# Patient Record
Sex: FEMALE | Race: BLACK OR AFRICAN AMERICAN | Employment: STUDENT | URBAN - METROPOLITAN AREA
[De-identification: names, ages, dates, MRNs, and addresses within clinical notes are randomized per-mention and may not be internally consistent; named-entity substitution may affect disease eponyms.]

---

## 2021-04-24 ENCOUNTER — AMBULATORY - HEALTHEAST (OUTPATIENT)
Dept: CARE COORDINATION | Facility: HOSPITAL | Age: 21
End: 2021-04-24

## 2021-04-24 LAB — INR (EXTERNAL): 1.1 (ref 0.9–1.1)

## 2021-06-16 PROBLEM — F10.20 SEVERE ALCOHOL USE DISORDER (H): Chronic | Status: ACTIVE | Noted: 2021-04-25

## 2021-06-16 PROBLEM — F33.1 MAJOR DEPRESSIVE DISORDER, RECURRENT EPISODE, MODERATE (H): Status: ACTIVE | Noted: 2021-04-26

## 2021-06-16 PROBLEM — F12.20 SEVERE CANNABIS USE DISORDER (H): Chronic | Status: ACTIVE | Noted: 2021-04-25

## 2021-06-16 PROBLEM — F43.23 ADJUSTMENT DISORDER WITH MIXED ANXIETY AND DEPRESSED MOOD: Chronic | Status: ACTIVE | Noted: 2021-04-25
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
## 2021-07-04 LAB
ALT SERPL-CCNC: 13 U/L (ref 7–45)
AST SERPL-CCNC: 19 U/L (ref 8–43)
HEP C HIM: NORMAL
HIV 1&2 EXT: NORMAL

## 2021-07-14 PROBLEM — F33.2 SEVERE RECURRENT MAJOR DEPRESSION WITHOUT PSYCHOTIC FEATURES (H): Chronic | Status: RESOLVED | Noted: 2021-04-25 | Resolved: 2021-04-26

## 2021-07-29 ENCOUNTER — TRANSFERRED RECORDS (OUTPATIENT)
Dept: HEALTH INFORMATION MANAGEMENT | Facility: CLINIC | Age: 21
End: 2021-07-29

## 2021-07-30 ENCOUNTER — TRANSFERRED RECORDS (OUTPATIENT)
Dept: HEALTH INFORMATION MANAGEMENT | Facility: CLINIC | Age: 21
End: 2021-07-30

## 2021-07-30 ENCOUNTER — TELEPHONE (OUTPATIENT)
Dept: BEHAVIORAL HEALTH | Facility: CLINIC | Age: 21
End: 2021-07-30

## 2021-07-30 LAB
CREATININE (EXTERNAL): 0.95 MG/DL (ref 0.59–1.04)
GFR ESTIMATED (EXTERNAL): 87 ML/MIN/BSA
GFR ESTIMATED (IF AFRICAN AMERICAN) (EXTERNAL): >90 ML/MIN/BSA
GLUCOSE (EXTERNAL): 83 MG/DL (ref 70–140)
POTASSIUM (EXTERNAL): 3.9 MMOL/L (ref 3.6–5.2)
TSH SERPL-ACNC: 0.5 MIU/L (ref 0.5–4.3)

## 2021-07-31 ENCOUNTER — HOSPITAL ENCOUNTER (INPATIENT)
Facility: CLINIC | Age: 21
LOS: 6 days | Discharge: HOME | DRG: 885 | End: 2021-08-06
Attending: PSYCHIATRY & NEUROLOGY | Admitting: PSYCHIATRY & NEUROLOGY
Payer: COMMERCIAL

## 2021-07-31 DIAGNOSIS — G47.9 SLEEP DIFFICULTIES: Primary | ICD-10-CM

## 2021-07-31 DIAGNOSIS — F33.2 MDD (MAJOR DEPRESSIVE DISORDER), RECURRENT SEVERE, WITHOUT PSYCHOSIS (H): ICD-10-CM

## 2021-07-31 DIAGNOSIS — F41.9 ANXIETY: ICD-10-CM

## 2021-07-31 DIAGNOSIS — F43.23 ADJUSTMENT DISORDER WITH MIXED ANXIETY AND DEPRESSED MOOD: ICD-10-CM

## 2021-07-31 DIAGNOSIS — F10.20 SEVERE ALCOHOL USE DISORDER (H): ICD-10-CM

## 2021-07-31 DIAGNOSIS — R52 PAIN: ICD-10-CM

## 2021-07-31 PROBLEM — F39 MOOD DISORDER (H): Status: ACTIVE | Noted: 2021-07-31

## 2021-07-31 PROCEDURE — 124N000001 HC R&B MH

## 2021-07-31 PROCEDURE — 128N000001 HC R&B CD/MH ADULT

## 2021-07-31 RX ORDER — ACETAMINOPHEN 325 MG/1
650 TABLET ORAL EVERY 4 HOURS PRN
Status: DISCONTINUED | OUTPATIENT
Start: 2021-07-31 | End: 2021-08-06 | Stop reason: HOSPADM

## 2021-07-31 RX ORDER — OLANZAPINE 10 MG/2ML
10 INJECTION, POWDER, FOR SOLUTION INTRAMUSCULAR 3 TIMES DAILY PRN
Status: DISCONTINUED | OUTPATIENT
Start: 2021-07-31 | End: 2021-08-06 | Stop reason: HOSPADM

## 2021-07-31 RX ORDER — HYDROXYZINE HYDROCHLORIDE 25 MG/1
25 TABLET, FILM COATED ORAL EVERY 4 HOURS PRN
Status: DISCONTINUED | OUTPATIENT
Start: 2021-07-31 | End: 2021-08-06

## 2021-07-31 RX ORDER — TRAZODONE HYDROCHLORIDE 50 MG/1
50 TABLET, FILM COATED ORAL
Status: DISCONTINUED | OUTPATIENT
Start: 2021-07-31 | End: 2021-08-06 | Stop reason: HOSPADM

## 2021-07-31 RX ORDER — AMOXICILLIN 250 MG
1 CAPSULE ORAL 2 TIMES DAILY PRN
Status: DISCONTINUED | OUTPATIENT
Start: 2021-07-31 | End: 2021-08-06 | Stop reason: HOSPADM

## 2021-07-31 RX ORDER — OLANZAPINE 10 MG/1
10 TABLET ORAL 3 TIMES DAILY PRN
Status: DISCONTINUED | OUTPATIENT
Start: 2021-07-31 | End: 2021-08-06 | Stop reason: HOSPADM

## 2021-07-31 RX ORDER — MAGNESIUM HYDROXIDE/ALUMINUM HYDROXICE/SIMETHICONE 120; 1200; 1200 MG/30ML; MG/30ML; MG/30ML
30 SUSPENSION ORAL EVERY 4 HOURS PRN
Status: DISCONTINUED | OUTPATIENT
Start: 2021-07-31 | End: 2021-08-06 | Stop reason: HOSPADM

## 2021-07-31 ASSESSMENT — ACTIVITIES OF DAILY LIVING (ADL)
DIFFICULTY_EATING/SWALLOWING: NO
DRESSING/BATHING_DIFFICULTY: NO
DRESS: INDEPENDENT
LAUNDRY: UNABLE TO COMPLETE
DIFFICULTY_COMMUNICATING: NO
ADL_ASSESSMENT: WDL
TOILETING_ISSUES: NO
CONCENTRATING,_REMEMBERING_OR_MAKING_DECISIONS_DIFFICULTY: NO
HEARING_DIFFICULTY_OR_DEAF: NO
PATIENT_/_FAMILY_COMMUNICATION_STYLE: SPOKEN LANGUAGE (ENGLISH OR BILINGUAL)
FALL_HISTORY_WITHIN_LAST_SIX_MONTHS: NO
WALKING_OR_CLIMBING_STAIRS_DIFFICULTY: NO
WEAR_GLASSES_OR_BLIND: YES
ORAL_HYGIENE: INDEPENDENT
HYGIENE/GROOMING: INDEPENDENT
DOING_ERRANDS_INDEPENDENTLY_DIFFICULTY: NO

## 2021-07-31 ASSESSMENT — MIFFLIN-ST. JEOR: SCORE: 1884.92

## 2021-07-31 NOTE — TELEPHONE ENCOUNTER
"S: [facility] ED called to place a 19 y/o female for inpatient mental health treatment.    B: Pt is a 19 y/o female with hx of MDD, Adjustment disorder with mixed disturbance of emotion and conduct, morbid obesity, and diabetes mellitus type 2 who presented to the [place] ED via police due to SI. Pt reported that she \"didn't really want to talk about what happened\" and declined to talk about suicidal ideation. The pt reports that in the past 3 months she has had \"a lot of stress\" and her symptoms of depression have increased. Pt's mother, Benjamín, provided collateral information and reported concerns of the pt making suicidal remarks and last night stating \"I want to kill myself\" while trying to overdose on medication and grabbing a large knife. Mother reported that the pt has been withholding information from her and having concerning behaviors. Mother reported that the pt was supposed to start a new job on Wednesday, but didn't go to it because she was sleeping. She stated that the pt scare the other children in the home with the events that occurred last night and she does not follow up with outpatient services after being in the hospital. Mother reports that she would like to see th pt get help and follow through with services after leaving the hospital. Pt reports hx of 2 previous psychiatric hospitalizations. Pt declined to discuss SI with either the ED MD or the mental health  but pt's mother reports that pt has gotten pills and a knife with intent to use them to end her life. Mother stated that last night the pt grabbed a bunch of medication to overdose on and went to the kitchen and grabbed a large knife and threatened to cut herself. Pt's mother stated that she had a struggle with the pt while the pt was trying to get these items, and when the police arrived the pt was still holding the knife making statements about ending her life. Pt did not endorse any HI. Pt endorses alcohol use \"from " "time to time\". Mother reports pt is using alcohol about once a week. When pt presented to the ED on 7/30/21 she had a WINNIE of 0.199. Pt reports hx of trying other drugs such as marijuana, acid, cocaine, and mushrooms. Utox is negative. She is negative for COVID-19. Medically cleared.    A: Voluntary.    R: Dr. Del Cid paged at to review for placement on . [facility]. Dr. Del Cid approved admission at 2203. Unit notified at 2225. ED notified at 2229.  Patient cleared and ready for behavioral bed placement: Yes    "

## 2021-07-31 NOTE — PROGRESS NOTES
Pt arrives to unit at 14:10, assessment started. Pt came from [place] [facility]. Pt says she has been there since Thursday. This is her third mental health hospitalization.

## 2021-08-01 PROCEDURE — 250N000013 HC RX MED GY IP 250 OP 250 PS 637: Performed by: PSYCHIATRY & NEUROLOGY

## 2021-08-01 PROCEDURE — 99223 1ST HOSP IP/OBS HIGH 75: CPT | Performed by: PSYCHIATRY & NEUROLOGY

## 2021-08-01 PROCEDURE — 128N000001 HC R&B CD/MH ADULT

## 2021-08-01 PROCEDURE — 124N000001 HC R&B MH

## 2021-08-01 RX ADMIN — TRAZODONE HYDROCHLORIDE 50 MG: 50 TABLET ORAL at 20:13

## 2021-08-01 ASSESSMENT — ACTIVITIES OF DAILY LIVING (ADL)
DRESS: INDEPENDENT
ORAL_HYGIENE: INDEPENDENT
ORAL_HYGIENE: INDEPENDENT
LAUNDRY: WITH SUPERVISION
HYGIENE/GROOMING: INDEPENDENT
HYGIENE/GROOMING: SHOWER;INDEPENDENT
DRESS: INDEPENDENT
LAUNDRY: WITH SUPERVISION

## 2021-08-01 NOTE — PHARMACY-ADMISSION MEDICATION HISTORY
"Pharmacy Note - Admission Medication History    Pertinent Provider Information: Patient reports she is not taking any medications, prescribed or OTC. She states she was \"supposed\" to be on lexapro but stopped taking it because \"I felt like I didn't need it.\"   ______________________________________________________________________    Prior To Admission (PTA) med list completed and updated in EMR.       No outpatient medications have been marked as taking for the 7/31/21 encounter (Hospital Encounter).       Information source(s): Patient, Clinic records, Hospital records and [facility]  Method of interview communication: phone    The information provided in this note is only as accurate as the sources available at the time of the update(s).    Thank you for the opportunity to participate in the care of this patient.    Maritza Coleman RPH  8/1/2021 4:03 PM    "

## 2021-08-01 NOTE — PLAN OF CARE
Problem: Sleep Disturbance  Goal: Adequate Sleep/Rest  Outcome: Improving     As of 0515, has slept > 6 hrs. Safety checks completed every 15 minutes. Suicide precautions continued. Pt has been sleeping uneventfully.
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
## 2021-08-01 NOTE — PROGRESS NOTES
"Staff reported to writer pt looked at her lunch tray and put it back and told staff she was not going to eat lunch. Staff reported to writer that pt then proceeded to her room and slammed her door shut. Writer spoke with pt and she refuses to meet with pharmacist this shift. Pt declines to talk to writer. She is dismissive. Pt states, \"I'm going back to bed.\" Will continue to monitor and assist as needed.   "

## 2021-08-01 NOTE — PROGRESS NOTES
08/01/21 1424   Engagement   Intervention Group   Topic Detail Activity for spontaneous recall, abstract thinking, categorizing, word-finding, concentration, and healthy leisure engagement   Attendance Did not attend  (sleeping on approach)

## 2021-08-01 NOTE — PLAN OF CARE
Problem: Behavioral Health Plan of Care  Goal: Adheres to Safety Considerations for Self and Others  Outcome: Improving     Problem: Suicidal Behavior  Goal: Suicidal Behavior is Absent or Managed  Outcome: Improving  Flowsheets (Taken 8/1/2021 1340)  Mutually Determined Action Steps (Suicidal Behavior Absent/Managed): verbalizes safety check rationale     Pt dismissive during assessment questions. Denies SI. Contracts for safety. LAI pt's anxiety and depression level as pt refuses to report. No hallucinations observed by writer - pt refused to answer. Pt denies pain. Pt irritable and dismissive throughout the day. Pt attended breakfast and went back to her room immediately after stating she wanted to sleep. Pt came back out for lunch but did not eat - see writer's previous note. Writer spoke to 5500 pharmacist regarding pt's refusal to meet with her this shift, and pharmacist is going to re-attempt around dinner time. Pt is withdrawn. She is observed sleeping majority of the day. She is isolative to her room. Pt is quiet and makes minimal eye contact. HR 50 this morning - pt refused recheck. She is on suicide precautions. Will continue to monitor and assist as needed.

## 2021-08-01 NOTE — PLAN OF CARE
"  Problem: Behavioral Health Plan of Care  Goal: Plan of Care Review  Outcome: Improving     Problem: Suicidal Behavior  Goal: Suicidal Behavior is Absent or Managed  Outcome: Improving      Problem: Behavioral Health Plan of Care  Goal: Plan of Care Review  Outcome: Improving     Problem: Suicidal Behavior  Goal: Suicidal Behavior is Absent or Managed  Outcome: Improving     Patient is a new admit from [facility]. Admitted at about 1410. Per report, pt presented to the ED via police due to SI but did not want to talk about what happened. Per report, pt stated that in the last 3 months, she has had a lot of stressors which have increased her depression. Patient's mother reported that patient have been making suicidal remarks and last night, patient tried to overdose on Zoloft, grabbed a knife and attempted to cut herself before patient's mom intervened. Patient's mom further reported that patient has been withholding information from her and having concerning behaviors. Pt's mom reports that she would like to see pt get help and follow through with services after leaving the hospital. Patient reported hx of 2 previous psychiatric hospitalization. On assessment, pt is alert and oriented x 4. Affect is flat and blunted. Mood observed as depressed. Patient is calm, withdrawn and isolative to self. Minimal speech, but clear and polite. Evasive eye contact. Minimally cooperative with admission assessment. Patient stated, \"I dont want to discuss about what happened.\" Patient did not interact with peers. Did not attend group. Guarded and dismissive. Intermittently present in the lounge and watched movie. Denied pain, anxiety, depression, SI/HI. Mood is labile. Thought content is coherent. Denied hallucinations/delusions. Contracted for safety. Had shower and reported feeling better.                           "

## 2021-08-02 PROCEDURE — 124N000001 HC R&B MH

## 2021-08-02 PROCEDURE — 90853 GROUP PSYCHOTHERAPY: CPT

## 2021-08-02 PROCEDURE — 250N000013 HC RX MED GY IP 250 OP 250 PS 637: Performed by: PSYCHIATRY & NEUROLOGY

## 2021-08-02 PROCEDURE — 128N000001 HC R&B CD/MH ADULT

## 2021-08-02 PROCEDURE — 99233 SBSQ HOSP IP/OBS HIGH 50: CPT | Performed by: PSYCHIATRY & NEUROLOGY

## 2021-08-02 RX ADMIN — SERTRALINE HYDROCHLORIDE 50 MG: 50 TABLET ORAL at 08:27

## 2021-08-02 RX ADMIN — ACETAMINOPHEN 650 MG: 325 TABLET ORAL at 08:27

## 2021-08-02 RX ADMIN — TRAZODONE HYDROCHLORIDE 50 MG: 50 TABLET ORAL at 20:30

## 2021-08-02 ASSESSMENT — ACTIVITIES OF DAILY LIVING (ADL)
DRESS: SCRUBS (BEHAVIORAL HEALTH)
ORAL_HYGIENE: INDEPENDENT
ORAL_HYGIENE: INDEPENDENT
DRESS: INDEPENDENT
HYGIENE/GROOMING: INDEPENDENT
HYGIENE/GROOMING: INDEPENDENT
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
## 2021-08-02 NOTE — PLAN OF CARE
Occupational Therapy   AIDET      Patient was introduced to the role of occupational therapy and oriented to the process of occupational therapy services on the unit, including function of groups. Patient was given the Occupational Therapy Questionnaire to complete. Patient in bed on approach. Pt minimally responsive to therapist. Questionnaire left on night stand. OT will follow up with assessment and address any questions, needs, or concerns.    Therapist: Natalie Peterson, OTR  8/2/2021

## 2021-08-02 NOTE — PROVIDER NOTIFICATION
08/02/21 1200   Engagement   Intervention Group   Topic Detail DBT Process Group   Attendance Attended   Patient Response Demonstrated understanding of materials provided   Concentrated on Task duration of group   Cognition Goal-directed   Mood/Affect Congruent   Social/Behavioral Cooperative   Thought Content [place]     GROUP LENGTH (MINS):   45   RELEVANT PRIMARY DIAGNOSIS: Patient Active Problem List   Diagnosis     Severe alcohol use disorder (H)     Severe cannabis use disorder (H)     Adjustment disorder with mixed anxiety and depressed mood     Major depressive disorder, recurrent episode, moderate (H)     Mood disorder (H)        TREATMENT MODALITY:      []CBT       [x]DBT       []ACT       []Interpersonal psychotherapy                                 []Psychoeducational therapy       []Skill development       []Other:        ATTENDANCE:        [x]Stayed for entire group     []Arrived late    [] Left early       # OF PATIENTS IN GROUP: 7   BILLABLE:     [x]Yes            []No   Notes:
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
## 2021-08-02 NOTE — PROGRESS NOTES
08/02/21 1515   Engagement   Intervention Group   Topic Detail OT Creative Expressions group-Watercolor pencil cards for creativity, symptom management, healthy distraction, social engagement and leisure exploration   Attendance Attended   Patient Response Demonstrated understanding of materials provided   Concentrated on Task 5 - 10 min   Mood/Affect Pleasant   Goals addressed in session today pt pulled from group by staff, returned at end of group

## 2021-08-02 NOTE — PLAN OF CARE
Problem: Behavioral Health Plan of Care  Goal: Optimized Coping Skills in Response to Life Stressors  Outcome: Improving  Note:  Pleasant when asked assessment questions. Denies anxiety, depression, and all psyche symptoms, contracts for safety. Isolative to room during morning, attends groups after lunch.

## 2021-08-02 NOTE — PLAN OF CARE
Problem: Sleep Disturbance  Goal: Adequate Sleep/Rest  Outcome: Improving     As of 0600, has slept > 6 hrs. Safety checks completed every 15 minutes. Suicide precautions continued. Pt has been sleeping uneventfully.

## 2021-08-02 NOTE — PLAN OF CARE
Problem: Behavioral Health Plan of Care  Goal: Adheres to Safety Considerations for Self and Others  Outcome: Improving     Problem: Suicidal Behavior  Goal: Suicidal Behavior is Absent or Managed  Outcome: Improving     Affect flat and blunted. Irritable at times especially when asked questions. Denies SI. Refuses to rate anxiety & depression. Contracts for safety. . Dismissive with assessment questions. Isolative to room at the beginning of the shift, but came out and stayed in the lounge after supper reading a book. Did not interact with peers. Attended group and community meeting. Had shower and reported feeling better. Appetite is good. Ate 100% of her dinner. Spoke to pharmacist on the phone about her medication. Prn trazodone given per patient request.

## 2021-08-03 PROCEDURE — 99232 SBSQ HOSP IP/OBS MODERATE 35: CPT | Performed by: PSYCHIATRY & NEUROLOGY

## 2021-08-03 PROCEDURE — 99231 SBSQ HOSP IP/OBS SF/LOW 25: CPT | Performed by: PSYCHOLOGIST

## 2021-08-03 PROCEDURE — 250N000013 HC RX MED GY IP 250 OP 250 PS 637: Performed by: PSYCHIATRY & NEUROLOGY

## 2021-08-03 PROCEDURE — 128N000001 HC R&B CD/MH ADULT

## 2021-08-03 PROCEDURE — 124N000001 HC R&B MH

## 2021-08-03 RX ADMIN — TRAZODONE HYDROCHLORIDE 50 MG: 50 TABLET ORAL at 20:54

## 2021-08-03 RX ADMIN — HYDROXYZINE HYDROCHLORIDE 25 MG: 25 TABLET, FILM COATED ORAL at 16:21

## 2021-08-03 RX ADMIN — SERTRALINE HYDROCHLORIDE 50 MG: 50 TABLET ORAL at 08:53

## 2021-08-03 ASSESSMENT — ACTIVITIES OF DAILY LIVING (ADL)
LAUNDRY: WITH SUPERVISION
ORAL_HYGIENE: INDEPENDENT
DRESS: INDEPENDENT
DRESS: INDEPENDENT
HYGIENE/GROOMING: INDEPENDENT
LAUNDRY: WITH SUPERVISION
ORAL_HYGIENE: INDEPENDENT
HYGIENE/GROOMING: INDEPENDENT

## 2021-08-03 ASSESSMENT — MIFFLIN-ST. JEOR: SCORE: 1876.3

## 2021-08-03 NOTE — PLAN OF CARE
Problem: Behavioral Health Plan of Care  Goal: Plan of Care Review  Outcome: No Change  Goal: Patient-Specific Goal (Individualization)  Outcome: No Change  Note:        Problem: Behavioral Health Plan of Care  Goal: Plan of Care Review  Outcome: No Change     Problem: Behavioral Health Plan of Care  Goal: Patient-Specific Goal (Individualization)  Outcome: No Change  Note:
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
## 2021-08-03 NOTE — PLAN OF CARE
Problem: Behavioral Health Plan of Care  Goal: Plan of Care Review  Outcome: Improving    Patient pleasant, visible, and engaged. Denies any anxiety, depression, SI/HI and other psych symptoms. Mood is calm. Contracts for safety. Patient attended meeting/OT groups. Medications and meals cooperative. No suicidal behavior noted.

## 2021-08-03 NOTE — PLAN OF CARE
"   08/03/21 1100   Prior Level of Function   People in home parent(s);sibling(s)  (3 younger siblings and mom)   ADLs   Activity/Exercise/Self-Care Comment Pt endorses sleeping too little but otherwise managing basic ADL's.    Therapy Assessment   Patient Presentation Pt presents as well groomed, reading book next to peers in lounge upon OT approach and agreeable to meet with writer. Declines to fill out OT assessment form provided yesterday but engages in some interview questions with writer. Pt is pleasant and cooperative, smiling at times throughout interview also somewhat labile; abruptly stating multiple times \"are we done?\" while smiling. Pt is mostly superficial/guarded in responses though does report that talking with peers on unit has helped her to become more comfortable talking with staff about needs.    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    Healthy Leisure Activities makeup, swimming    Patient-identified areas of difficulty Memory problems;Lacks support;Motivation/mood;Using drugs or alcohol;Sleeping too much or not enough;Missing work/appointments;Lack of satisfying daily routine   Patient-identified sources of support Safe place to live;Family, friends or caregivers to help when needed;A best frient or significant other;Belief in self and abilities   Patient-identified coping stategies for these concerns Being by myself, breathing, walking away from situation.    Patient-identified stressors or changes in the past year Pt reports that she did not finish senior year of high school due to stressors, however did not elaborate on stressors.    Patient-identified values privacy/boundaries "   Patient's goal for the future To have a satisfying daily routine    Patient's goals to work on with OT Feel better;Learn ways to stay well and avoid coming to the hospital;Share own thoughts and feelings;Develop a satisfying daily routine   Clinical Impression   Patient Personal Strengths independent living skills;interests/hobbies;self-awareness;family/social support;positive attitude   Pt appears to have the following barriers/limitations Lack of daily routine;Poorly managed mental health symptoms;Limited insight into mental health symptoms;Medication noncompliance   Patient's barriers/limitations contribute to Exacerbation of mental health symptoms;Decreased ADL/IADL performance;Poor follow through with treatment recommendations   Planned Interventions Encourage group attendance;Identify and develop coping strategies;Identify and develop relapse prevention plan;Continue to build rapport;Engage in activities for insight development   Risk & Benefits of therapy have been explained participants included;patient   Minutes Spent with Patient 10   Beh OT Plan for Next Session Target: Engage in two meaningful conversations with OT during or outside of group time during this review period.

## 2021-08-03 NOTE — PROGRESS NOTES
08/03/21 1527   Engagement   Intervention Group   Topic Detail OT Creative Expressions group-Watercolor pencil cards for creativity, symptom management, healthy distraction, social engagement and leisure exploration   Attendance Attended   Patient Response Demonstrated understanding of materials provided;Positive attitude   Concentrated on Task duration of group   Cognition Distractible;Goal-directed;Follows through with task   Mood/Affect Pleasant   Social/Behavioral Engaged;Motivated     Pt joined group and was social with a select peer; pt assisted peer with redirection when talking during check in; pt was pleasant and engaged during group.

## 2021-08-03 NOTE — PROGRESS NOTES
08/03/21 1000   Engagement   Intervention Group   Topic Detail OT: Wellness group to promote movement/exercise, symptom management, healthy coping skills, sequencing/attention, self-worth, and opportunity for positive social interactions.    Attendance Did not attend   Reason for Not Attending Refused

## 2021-08-03 NOTE — PROGRESS NOTES
Pt alert, pleasant and engaged on the unit, denies psych symptoms, rates foot pain 1.5/10 but declines intervention, med compliant, social with peers, visible for meals, groups and out on patio, watching TV this shift, took a shower and requested Trazodone for sleep.

## 2021-08-03 NOTE — PROVIDER NOTIFICATION
08/03/21 1600   Engagement   Intervention Group   Topic Insight development/self-awareness   Topic Detail Process: Strengths   Attendance Attended   Patient Response Demonstrated understanding of materials provided;Asked questions and/or took notes;Expressed feelings/issues;Was respectful;Expressed hope   Concentrated on Task duration of group   Cognition Goal-directed;Takes initiative   Mood/Affect Congruent;Pleasant   Social/Behavioral Cooperative;Engaged   Thought Content Insightful;Reality oriented     GROUP LENGTH (MINS):   45 min   RELEVANT PRIMARY DIAGNOSIS: Patient Active Problem List   Diagnosis     Severe alcohol use disorder (H)     Severe cannabis use disorder (H)     Adjustment disorder with mixed anxiety and depressed mood     Major depressive disorder, recurrent episode, moderate (H)     Mood disorder (H)        TREATMENT MODALITY:      [x]CBT       []DBT       []ACT       []Interpersonal psychotherapy                                 []Psychoeducational therapy       []Skill development       []Other:        ATTENDANCE:        [x]Stayed for entire group     []Arrived late    [] Left early       # OF PATIENTS IN GROUP: 10    BILLABLE:     []Yes            [x]No   Notes:

## 2021-08-03 NOTE — PLAN OF CARE
Problem: Behavioral Health Plan of Care  Goal: Plan of Care Review  Outcome: Improving     Problem: Suicidal Behavior  Goal: Suicidal Behavior is Absent or Managed  Outcome: Improving     Patient was visible on the unit socializing and engaging with peers. Attended group meeting. Out for all meals. Denied pain all shift. Rated anxiety 5/10 and was given prn Atarax 25 mg which was helpful. Denied depression and all other psych symptoms. Contracted for safety. She was cooperative with cares. Prn Trazodone 50 mg was given for sleep. No other concern or behavior noted at this time. Will continue to monitor and will assist if need arise.

## 2021-08-03 NOTE — PROGRESS NOTES
[id number]: Pt sleeping at the start of the shift in no apparent distress. Continue on suicide precaution. No SI related behavior noted.Slept all night for greater than 6 hours.Safety maintained.

## 2021-08-03 NOTE — PLAN OF CARE
Assessment/Intervention/Current Symtoms and Care Coordination    Zacheryr left a voicemail for the patient's mother with my contact information requesting a call back.   scheduled follow-up primary care and therapy appointments at the [facility] where the patient is established.  Zacheryr requested a referral be made by the primary care provider to see a psychiatrist there as well per their process.     When  met with the patient she appeared pleasant and reported she feels ready to discharge this week. She denied suicidal thoughts and reported she feels stable. She reported she met with the psychologist today and that it went well. She signed ROIs for a  chemical health assessment to be completed while in the hospital.  However, it is not possible to schedule a chemical health assessment and receive the recommnedations prior to the predicted discharge this Friday.  scheduled a chemical health assessment at the [facility] in [place] for next Tuesday 8/10 at 10:31am.       Discharge Plan or Goal    possibly return home Friday 8/6 home with mental health and chemical health referrals in place       Barriers to Discharge     Medication management, coordination of services      Referral Status    8/3: primary care, psychiatry, therapy, chemical health assessment     Legal Status: voluntary      Dianna Lorenzo [facility], 8/3/2021, 3:11 PM

## 2021-08-04 PROBLEM — F33.2 MDD (MAJOR DEPRESSIVE DISORDER), RECURRENT SEVERE, WITHOUT PSYCHOSIS (H): Status: ACTIVE | Noted: 2021-04-25

## 2021-08-04 PROBLEM — F43.10 POSTTRAUMATIC STRESS DISORDER: Status: ACTIVE | Noted: 2021-08-01

## 2021-08-04 PROCEDURE — 124N000001 HC R&B MH

## 2021-08-04 PROCEDURE — 128N000001 HC R&B CD/MH ADULT

## 2021-08-04 PROCEDURE — 99232 SBSQ HOSP IP/OBS MODERATE 35: CPT | Performed by: PSYCHIATRY & NEUROLOGY

## 2021-08-04 PROCEDURE — 250N000013 HC RX MED GY IP 250 OP 250 PS 637: Performed by: PSYCHIATRY & NEUROLOGY

## 2021-08-04 RX ORDER — GABAPENTIN 300 MG/1
300 CAPSULE ORAL 3 TIMES DAILY
Status: DISCONTINUED | OUTPATIENT
Start: 2021-08-04 | End: 2021-08-06 | Stop reason: HOSPADM

## 2021-08-04 RX ORDER — BUSPIRONE HYDROCHLORIDE 10 MG/1
10 TABLET ORAL 3 TIMES DAILY
Status: DISCONTINUED | OUTPATIENT
Start: 2021-08-04 | End: 2021-08-06 | Stop reason: HOSPADM

## 2021-08-04 RX ADMIN — SERTRALINE HYDROCHLORIDE 50 MG: 50 TABLET ORAL at 08:05

## 2021-08-04 RX ADMIN — HYDROXYZINE HYDROCHLORIDE 25 MG: 25 TABLET, FILM COATED ORAL at 16:00

## 2021-08-04 RX ADMIN — ACETAMINOPHEN 650 MG: 325 TABLET ORAL at 13:57

## 2021-08-04 RX ADMIN — BUSPIRONE HYDROCHLORIDE 10 MG: 10 TABLET ORAL at 20:27

## 2021-08-04 RX ADMIN — TRAZODONE HYDROCHLORIDE 50 MG: 50 TABLET ORAL at 21:27

## 2021-08-04 RX ADMIN — BUSPIRONE HYDROCHLORIDE 10 MG: 10 TABLET ORAL at 16:10

## 2021-08-04 RX ADMIN — GABAPENTIN 300 MG: 300 CAPSULE ORAL at 20:27

## 2021-08-04 ASSESSMENT — ACTIVITIES OF DAILY LIVING (ADL)
LAUNDRY: WITH SUPERVISION
HYGIENE/GROOMING: INDEPENDENT
LAUNDRY: WITH SUPERVISION
ORAL_HYGIENE: INDEPENDENT
HYGIENE/GROOMING: INDEPENDENT
DRESS: INDEPENDENT
DRESS: INDEPENDENT
ORAL_HYGIENE: INDEPENDENT

## 2021-08-04 NOTE — PLAN OF CARE
Problem: Behavioral Health Plan of Care  Goal: Plan of Care Review  Outcome: Improving     Problem: Suicidal Behavior  Goal: Suicidal Behavior is Absent or Managed  Outcome: Improving     Problem: Suicide Risk  Goal: Absence of Self-Harm  Outcome: Improving     Patient was visible on the unit. Had a flat blunted affect. Attended group meeting. Engaged and socialized with peers. Out for all meals with good appetite. Denied pain all shift. Rated anxiety 3/10 and was given prn Atarax 25 mg which was helpful. Denied depression and all other psych symptoms. Contracted for safety. She was cooperative and med compliant. Prn trazodone 50 mg was given for sleep. She was started on Buspar 10 mg 3 times daily, gabapentin 300 mg 3 times daily and CIWA. Her CIWA score was 0. No other concern or behavior noted at this time. Will continue to monitor and will assist if need arise.

## 2021-08-04 NOTE — PROVIDER NOTIFICATION
08/04/21 1200   Engagement   Intervention Group   Topic Insight development/self-awareness   Topic Detail Wise Mind   Attendance Attended   Patient Response Demonstrated understanding of materials provided;Was respectful   Concentrated on Task 15 - 30 min   Cognition Follows through with task   Mood/Affect Pleasant   Social/Behavioral Cooperative   Thought Content Reality oriented     GROUP LENGTH (MINS):   50 min   RELEVANT PRIMARY DIAGNOSIS: Patient Active Problem List   Diagnosis     Severe alcohol use disorder (H)     Severe cannabis use disorder (H)     Adjustment disorder with mixed anxiety and depressed mood     Major depressive disorder, recurrent episode, moderate (H)     Mood disorder (H)        TREATMENT MODALITY:      []CBT       [x]DBT       []ACT       []Interpersonal psychotherapy                                 []Psychoeducational therapy       []Skill development       []Other:        ATTENDANCE:        []Stayed for entire group     []Arrived late    [x] Left early       # OF PATIENTS IN GROUP: 10   BILLABLE:     []Yes            [x]No   Notes:

## 2021-08-04 NOTE — PROGRESS NOTES
08/04/21 1100   Engagement   Intervention Group   Topic Detail OT: Wellness (self-care nail care, skin scrubs) for coping with stress and symptoms   Attendance Other (Comment)     Pt came and socialized but did not engage in any activities.

## 2021-08-04 NOTE — PLAN OF CARE
Problem: Behavioral Health Plan of Care  Goal: Plan of Care Review  Outcome: Improving  Flowsheets (Taken 8/4/2021 3089)  Plan of Care Reviewed With: patient  Patient Agreement with Plan of Care: agrees     Patient flat, pleasant, visible, and engaged. Patient denies any depression, anxiety, SI/HI and other psych symptoms. Consents for safety. Attended group meetings, socializes with peers and staff. No suicidal behavior noted.
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
## 2021-08-04 NOTE — PLAN OF CARE
Assessment/Intervention/Current Symtoms and Care Coordination    Writer spoke with the patient's mother and reported the plan was for the patient to discharge on Friday and we discussed the community appointments that had been scheduled.  She reported she or the patient's sister could pick her up depending on the time of day.     Discharge Plan or Goal    possibly return home Friday 8/6 home with mental health and chemical health referrals in place     Barriers to Discharge     Medication management, coordination of services    Referral Status    8/3: primary care, psychiatry, therapy, chemical health assessment     Legal Status: voluntary      Dianna Lorenzo [facility], 8/4/2021, 4:03 PM

## 2021-08-04 NOTE — PROGRESS NOTES
Pt sleeping comfortably in bed this  shift  no apparent distress. Continue on suicide precaution. No SI related behavior noted.Slept all night for greater than 6 hours.Safety maintained, predicted discharge Friday.

## 2021-08-04 NOTE — PROGRESS NOTES
"PSYCHIATRY PROGRESS NOTE         DATE OF SERVICE:     8/4/2021         CHIEF COMPLAINT:   Response to medications, depression anxiety, medication adjustment          OBJECTIVE:     Nursing reports : Patient is going to groups, taking medications, visible on the unit     reports working on outpatient referrals         SUBJECTIVE:      Sammi says that she feels anxious.  She denies other withdrawal symptoms from alcohol.  Blood pressure, pulse and respirations are normal.  We discussed using Neurontin and BuSpar for anxiety.  She presently denies suicidal and homicidal ideas.  She denies delusions and hallucinations.  Appetite is better.  She has more energy.  Concentration below baseline, but improving.  She is going to groups.  She is visible on the unit.  No altercations with staff or patients.   says that she cannot get chemical dependency evaluated to do inpatient evaluation because its long waiting time, so she scheduled outpatient evaluation for next week.  Patient agrees with that.  She denies other medical problems.         MEDICATIONS:       busPIRone  10 mg Oral TID     sertraline  50 mg Oral Daily     acetaminophen, alum & mag hydroxide-simethicone, hydrOXYzine, nicotine polacrilex, OLANZapine **OR** OLANZapine, senna-docusate, traZODone    Medication adherence: Yes  Medication side effects: No  Benefit: Symptom reduction         ROS:   As per history of present illness, otherwise reminder of review of systems is negative for: General, eyes, ears, nose, throat, neck, respiratory, cardiovascular, gastrointestinal, genitourinary, meniscal skeletal, neurological, hematological, dermatological and endocrine system.         MENTAL STATUS EXAM:   /83   Pulse 61   Temp 97.4  F (36.3  C) (Oral)   Resp 16   Ht 1.727 m (5' 8\")   Wt 105.3 kg (232 lb 1.6 oz)   SpO2 100%   BMI 35.29 kg/m      Appearance:fair hygiene  Orientation:x3  Speech:fluent  Language ability: Appropriate " syntax and vocabulary  Thought process: concrete  Thought content: Denies delusions and hallucinations  Associations: Connected  Suicidal Ideation: Denies  Homicidal Ideation: Denies  Mood: Depressed  Affect: Anxious  Intellectual functioning:average  Fund of Knowledge: average  Attention/Concentration: decreased  Memory: intact  Psychomotor Behavior: calm  Muscle Strength and Tone: no atrophy or involuntary movement  Gait and Station: steady  Insight and judgement:fair in terms of that she wants help         LABS:   personally reviewed.     7/30/2021 from [facility]  COVID-19 negative  Alcohol 199  Pregnancy test negative  Urine toxicology screen negative  Chloride 108  Bicarb 26  Anion gap 9  BUN 6  Creatinine 0.95  Calcium 8.9  Glucose 83  GFR 87  TSH 0.5          DIAGNOSIS:     Major depressive disorder recurrent severe without psychosis Posttraumatic stress disorder  Adjustment disorder with mixed anxiety and depressed mood  Alcohol use disorder severe      Patient Active Problem List   Diagnosis     Severe alcohol use disorder (H)     Severe cannabis use disorder (H)     Adjustment disorder with mixed anxiety and depressed mood     Major depressive disorder, recurrent episode, moderate (H)     MDD (major depressive disorder), recurrent severe, without psychosis (H)     Mood disorder (H)     Posttraumatic stress disorder          PLAN:   Patient and I discussed diagnosis and treatment plan and patient agrees with the following recommendations:    Medications:  Start BuSpar 10 mg 3 times daily for anxiety  Start Neurontin 300 mg 3 times daily for anxiety  Zoloft 50 mg every morning for depression  Trazodone  milligrams every night as needed for sleep  Vistaril 25 milligrams 4 times daily as needed as needed for anxiety  CIWA alcohol withdrawal  We discussed side effects, benefits and alternative treatments and patient agrees with capacity to do so.  Rule 25 for chemical dependency treatment    will collect collateral information and make outpatient referrals  Staff to provide emotional support and redirect as needed  Patient encouraged to attend groups  Lab results: Reviewed personally  Consultation: According to patient symptom report    Risk Assessment: Suicidal thoughts on admission, contracts for safety now    Coordination of Care:   Patient seen, medical record reviewed, care coordinated with the team.    This document is created with the help of Dragon dictation system.  All grammatical/typing errors or context distortion are unintentional and inherent to software.    Becky Giraldo MD       Re-Certification I certify that the inpatient psychiatric facility services furnished since the previous certification were, and continue to be, medically necessary for, either, treatment which could reasonably be expected to improve the patient s condition or diagnostic study and that the hospital records indicate that the services furnished were, either, intensive treatment services, admission and related services necessary for diagnostic study, or equivalent services.     I certify that the patient continues to need, on a daily basis, active treatment furnished directly by or requiring the supervision of inpatient psychiatric facility personnel.   I estimate TBD days of hospitalization is necessary for proper treatment of the patient. My plans for post-hospital care for this patient are : Medications, appointments       Becky Giraldo MD

## 2021-08-04 NOTE — PLAN OF CARE
Problem: Behavioral Health Plan of Care  Goal: Plan of Care Review  Outcome: No Change  Goal: Adheres to Safety Considerations for Self and Others  Intervention: Develop and Maintain Individualized Safety Plan  Recent Flowsheet Documentation  Taken 8/4/2021 0100 by Magdalene Dumont RN  Safety Measures: environmental rounds completed  Goal: Absence of New-Onset Illness or Injury  Intervention: Identify and Manage Fall Risk  Recent Flowsheet Documentation  Taken 8/4/2021 0100 by Magdalene Dumont RN  Safety Measures: environmental rounds completed     Problem: Behavioral Health Plan of Care  Goal: Plan of Care Review  Outcome: No Change     Problem: Behavioral Health Plan of Care  Goal: Adheres to Safety Considerations for Self and Others  Intervention: Develop and Maintain Individualized Safety Plan  Recent Flowsheet Documentation  Taken 8/4/2021 0100 by Magdalene Dumont RN  Safety Measures: environmental rounds completed     Problem: Behavioral Health Plan of Care  Goal: Adheres to Safety Considerations for Self and Others  Intervention: Develop and Maintain Individualized Safety Plan  Recent Flowsheet Documentation  Taken 8/4/2021 0100 by Magdalene Dumont RN  Safety Measures: environmental rounds completed     Problem: Behavioral Health Plan of Care  Goal: Absence of New-Onset Illness or Injury  Intervention: Identify and Manage Fall Risk  Recent Flowsheet Documentation  Taken 8/4/2021 0100 by Magdalene Dumont RN  Safety Measures: environmental rounds completed     Problem: Suicidal Behavior  Goal: Suicidal Behavior is Absent or Managed  Outcome: Improving     Problem: Suicide Risk  Goal: Absence of Self-Harm  Outcome: Improving     Problem: Sleep Disturbance  Goal: Adequate Sleep/Rest  Outcome: Improving

## 2021-08-05 VITALS
RESPIRATION RATE: 16 BRPM | SYSTOLIC BLOOD PRESSURE: 120 MMHG | OXYGEN SATURATION: 99 % | BODY MASS INDEX: 35.18 KG/M2 | HEIGHT: 68 IN | HEART RATE: 55 BPM | DIASTOLIC BLOOD PRESSURE: 76 MMHG | WEIGHT: 232.1 LBS | TEMPERATURE: 98.3 F

## 2021-08-05 PROCEDURE — 250N000013 HC RX MED GY IP 250 OP 250 PS 637: Performed by: PSYCHIATRY & NEUROLOGY

## 2021-08-05 PROCEDURE — 90853 GROUP PSYCHOTHERAPY: CPT

## 2021-08-05 PROCEDURE — 124N000001 HC R&B MH

## 2021-08-05 PROCEDURE — 99232 SBSQ HOSP IP/OBS MODERATE 35: CPT | Performed by: PSYCHIATRY & NEUROLOGY

## 2021-08-05 PROCEDURE — 128N000001 HC R&B CD/MH ADULT

## 2021-08-05 PROCEDURE — 99231 SBSQ HOSP IP/OBS SF/LOW 25: CPT | Performed by: PSYCHOLOGIST

## 2021-08-05 RX ADMIN — GABAPENTIN 300 MG: 300 CAPSULE ORAL at 08:12

## 2021-08-05 RX ADMIN — TRAZODONE HYDROCHLORIDE 50 MG: 50 TABLET ORAL at 21:09

## 2021-08-05 RX ADMIN — BUSPIRONE HYDROCHLORIDE 10 MG: 10 TABLET ORAL at 21:09

## 2021-08-05 RX ADMIN — BUSPIRONE HYDROCHLORIDE 10 MG: 10 TABLET ORAL at 08:12

## 2021-08-05 RX ADMIN — TRAZODONE HYDROCHLORIDE 50 MG: 50 TABLET ORAL at 22:13

## 2021-08-05 RX ADMIN — SERTRALINE HYDROCHLORIDE 50 MG: 50 TABLET ORAL at 08:12

## 2021-08-05 RX ADMIN — BUSPIRONE HYDROCHLORIDE 10 MG: 10 TABLET ORAL at 13:07

## 2021-08-05 RX ADMIN — GABAPENTIN 300 MG: 300 CAPSULE ORAL at 13:07

## 2021-08-05 RX ADMIN — HYDROXYZINE HYDROCHLORIDE 25 MG: 25 TABLET, FILM COATED ORAL at 17:25

## 2021-08-05 RX ADMIN — GABAPENTIN 300 MG: 300 CAPSULE ORAL at 21:09

## 2021-08-05 ASSESSMENT — ACTIVITIES OF DAILY LIVING (ADL)
LAUNDRY: WITH SUPERVISION
DRESS: INDEPENDENT
HYGIENE/GROOMING: INDEPENDENT
HYGIENE/GROOMING: INDEPENDENT
DRESS: INDEPENDENT
LAUNDRY: WITH SUPERVISION
ORAL_HYGIENE: INDEPENDENT
ORAL_HYGIENE: INDEPENDENT

## 2021-08-05 NOTE — PROGRESS NOTES
08/05/21 1000   Engagement   Intervention Group   Topic Detail OT: Day [employer] to promote concentration, attention to detail, planning, sequencing, frustration tolerance, coping skills, healthy leisure exploration, and opportunity for positive social interactions.     Attendance Attended   Patient Response Demonstrated understanding of materials provided;Was respectful;Positive attitude   Concentrated on Task duration of group   Cognition Goal-directed;Takes initiative;Follows through with task;Sequences task;Attends to detail   Mood/Affect Pleasant;Tolerates frustration   Social/Behavioral Cooperative;Engaged   Thought Content Reality oriented   Goals addressed in session today yes     Pt engaged well with peers, contributed to discussion, and participated in group activities. Pt is progressing towards goal through group participation.

## 2021-08-05 NOTE — PLAN OF CARE
Problem: Behavioral Health Plan of Care  Goal: Adheres to Safety Considerations for Self and Others  Outcome: Improving  Intervention: Develop and Maintain Individualized Safety Plan  Recent Flowsheet Documentation  Taken 8/5/2021 0000 by eSrena Lofton RN  Safety Measures: environmental rounds completed     Problem: Suicidal Behavior  Goal: Suicidal Behavior is Absent or Managed  Outcome: Improving     Problem: Sleep Disturbance  Goal: Adequate Sleep/Rest  Outcome: Improving   Pt is on suicidal precaution, no suicide noted during the night. Pt slept 5-6 hours, denied pains / discomforts, resp wnl. No behavior problems, will continue to monitor.

## 2021-08-05 NOTE — PROGRESS NOTES
08/05/21 1200   Engagement   Intervention Group   Topic Insight development/self-awareness;Self-worth/hopefulness   Topic Detail   (Process group: Gratitude)   Attendance Attended   Patient Response Demonstrated understanding of materials provided;Expressed feelings/issues   Concentrated on Task duration of group   Cognition Goal-directed;Takes initiative   Mood/Affect Pleasant   Social/Behavioral Cooperative;Engaged   Thought Content Reality oriented     GROUP LENGTH (MINS):   50m   RELEVANT PRIMARY DIAGNOSIS: Patient Active Problem List   Diagnosis     Severe alcohol use disorder (H)     Severe cannabis use disorder (H)     Adjustment disorder with mixed anxiety and depressed mood     Major depressive disorder, recurrent episode, moderate (H)     MDD (major depressive disorder), recurrent severe, without psychosis (H)     Mood disorder (H)     Posttraumatic stress disorder        TREATMENT MODALITY:      []CBT       []DBT       []ACT       [x]Interpersonal psychotherapy                                 []Psychoeducational therapy       []Skill development       []Other:        ATTENDANCE:        [x]Stayed for entire group     []Arrived late    [] Left early       # OF PATIENTS IN GROUP: 10   BILLABLE:     []Yes            [x]No   Notes:

## 2021-08-05 NOTE — PROVIDER NOTIFICATION
08/05/21 1400   Engagement   Intervention Group   Topic Detail CBT Cognitive Model   Attendance Attended   Patient Response Demonstrated understanding of materials provided   Concentrated on Task duration of group   Mood/Affect Pleasant   Social/Behavioral Cooperative;Engaged   Thought Content Reality oriented     GROUP LENGTH (MINS):   40 minutes   RELEVANT PRIMARY DIAGNOSIS: Patient Active Problem List   Diagnosis     Severe alcohol use disorder (H)     Severe cannabis use disorder (H)     Adjustment disorder with mixed anxiety and depressed mood     Major depressive disorder, recurrent episode, moderate (H)     MDD (major depressive disorder), recurrent severe, without psychosis (H)     Mood disorder (H)     Posttraumatic stress disorder        TREATMENT MODALITY:      [x]CBT       []DBT       []ACT       []Interpersonal psychotherapy                                 []Psychoeducational therapy       []Skill development       []Other:        ATTENDANCE:        [x]Stayed for entire group     []Arrived late    [] Left early       # OF PATIENTS IN GROUP: 7   BILLABLE:     [x]Yes            []No   Notes:
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
## 2021-08-05 NOTE — PLAN OF CARE
Problem: Behavioral Health Plan of Care  Goal: Adheres to Safety Considerations for Self and Others  Outcome: Improving    Patient is blunted flat, visible, and engaged. She denies any anxiety, depression, SI/HI. Consents for safety. Patient attended group sessions. Her mood is calm and she socializes with peers and staff. For no suicidal behavior noted.
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
## 2021-08-05 NOTE — PROGRESS NOTES
08/05/21 1500   Engagement   Intervention Group   Topic Detail OT: Leisure practice and education through process and engagement (discussion/self-interest checklist/pictionary) for social, emotional, intellectual wellness, coping with stress/symptoms, and insight development   Attendance Attended   Patient Response Demonstrated understanding of materials provided;Demonstrated leadership;Positive attitude;Prosocial behavior;Contributes to conversation   Concentrated on Task duration of group   Cognition Goal-directed   Mood/Affect Pleasant   Social/Behavioral Cooperative;Engaged   Thought Content Reality oriented   Goals addressed in session today yes     Pt engaged well with peers, contributed to discussion, and participated in group activities. Pt is progressing towards goal through group participation.

## 2021-08-05 NOTE — PROGRESS NOTES
Pt is requesting for Tori (a friend) to visit. Writer contacted Tori to explain visiting guidelines. Guidelines also explained to patient. Both tori and patient are agreeable to guidelines. Designated Visitor box filled in on Epic.  Bryanna Fitzpatrick RN    Upon arrival to unit, it was discovered that Tori is a former pt who had recently discharged. Tori was told she may not visit for six months, per hospital policy.

## 2021-08-05 NOTE — PLAN OF CARE
Assessment/Intervention/Current Symptoms and Care Coordination    Writer went over discharge appointments/recommendations with the patient today. Patient reported she has benefited from her hospitalization by sharing her feelings with peers and with the psychologist. She reported she is motivated to continue to address her mental health and substance use in the community. Patient reported she will speak with her sister tonight about when the sister can pick her up tomorrow.      Discharge Plan or Goal    return home Friday 8/6 home with mental health and chemical health referrals in place     Barriers to Discharge     Medication management, coordination of services    Referral Status    8/3: primary care, psychiatry, therapy, chemical health assessment     Legal Status: voluntary      Dianna Bj [facility], 8/5/2021, 4:21 PM

## 2021-08-06 PROBLEM — F43.23 ADJUSTMENT DISORDER WITH MIXED ANXIETY AND DEPRESSED MOOD: Status: ACTIVE | Noted: 2021-08-01

## 2021-08-06 PROBLEM — F33.1 MAJOR DEPRESSIVE DISORDER, RECURRENT EPISODE, MODERATE (H): Status: RESOLVED | Noted: 2021-04-26 | Resolved: 2021-08-06

## 2021-08-06 PROCEDURE — 99239 HOSP IP/OBS DSCHRG MGMT >30: CPT | Performed by: PSYCHIATRY & NEUROLOGY

## 2021-08-06 PROCEDURE — 250N000013 HC RX MED GY IP 250 OP 250 PS 637: Performed by: PSYCHIATRY & NEUROLOGY

## 2021-08-06 RX ORDER — DIPHENHYDRAMINE HCL 50 MG
50 CAPSULE ORAL
Status: DISCONTINUED | OUTPATIENT
Start: 2021-08-06 | End: 2021-08-06 | Stop reason: HOSPADM

## 2021-08-06 RX ORDER — HYDROXYZINE HYDROCHLORIDE 25 MG/1
25 TABLET, FILM COATED ORAL 4 TIMES DAILY PRN
Status: DISCONTINUED | OUTPATIENT
Start: 2021-08-06 | End: 2021-08-06 | Stop reason: HOSPADM

## 2021-08-06 RX ORDER — DIPHENHYDRAMINE HCL 50 MG
50 CAPSULE ORAL
Qty: 30 CAPSULE | Refills: 0 | Status: SHIPPED | OUTPATIENT
Start: 2021-08-06

## 2021-08-06 RX ORDER — TRAZODONE HYDROCHLORIDE 50 MG/1
50 TABLET, FILM COATED ORAL
Qty: 30 TABLET | Refills: 0 | Status: SHIPPED | OUTPATIENT
Start: 2021-08-06

## 2021-08-06 RX ORDER — ACETAMINOPHEN 325 MG/1
650 TABLET ORAL EVERY 4 HOURS PRN
COMMUNITY
Start: 2021-08-06

## 2021-08-06 RX ORDER — HYDROXYZINE HYDROCHLORIDE 25 MG/1
25 TABLET, FILM COATED ORAL 4 TIMES DAILY PRN
Qty: 30 TABLET | Refills: 0 | Status: SHIPPED | OUTPATIENT
Start: 2021-08-06

## 2021-08-06 RX ORDER — BUSPIRONE HYDROCHLORIDE 10 MG/1
10 TABLET ORAL 3 TIMES DAILY
Qty: 90 TABLET | Refills: 0 | Status: SHIPPED | OUTPATIENT
Start: 2021-08-06

## 2021-08-06 RX ORDER — GABAPENTIN 300 MG/1
300 CAPSULE ORAL 3 TIMES DAILY
Qty: 90 CAPSULE | Refills: 0 | Status: SHIPPED | OUTPATIENT
Start: 2021-08-06

## 2021-08-06 RX ADMIN — GABAPENTIN 300 MG: 300 CAPSULE ORAL at 08:00

## 2021-08-06 RX ADMIN — SERTRALINE HYDROCHLORIDE 50 MG: 50 TABLET ORAL at 08:00

## 2021-08-06 RX ADMIN — GABAPENTIN 300 MG: 300 CAPSULE ORAL at 13:15

## 2021-08-06 RX ADMIN — BUSPIRONE HYDROCHLORIDE 10 MG: 10 TABLET ORAL at 08:00

## 2021-08-06 RX ADMIN — BUSPIRONE HYDROCHLORIDE 10 MG: 10 TABLET ORAL at 13:15

## 2021-08-06 NOTE — PROGRESS NOTES
08/06/21 1000   Engagement   Intervention Group   Topic Detail OT: Cognitive challenge (word-a-round, spot it, tapple) for intellectual wellness, coping with stress/sx, and leisure/social skill practice   Attendance Attended   Patient Response Demonstrated understanding of materials provided;Contributes to conversation   Concentrated on Task duration of group   Cognition Goal-directed   Mood/Affect Pleasant   Social/Behavioral Cooperative;Engaged   Thought Content Reality oriented   Goals addressed in session today yes     Pt engaged well with peers, contributed to discussion, and participated in group activities. Pt demonstrated some difficulty with frustration tolerance. Pt had average to above average cognitive processing speed.

## 2021-08-06 NOTE — PLAN OF CARE
Occupational Therapy Discharge Summary      Patient Name: Sammi Varma    Date of OT Discharge: 8/6/2021    Problem: Social Interaction  Goal: Communicate Appropriately  Description: Target: Engage in two meaningful conversations with OT during or outside of group time during this review period.   Outcome: Completed     Discontinue OT services. Refer to flowsheet and notes for progress towards goals.    Discharge Comments: home with outpatient supports and referrals    Therapist: Fatou Benjamin MA, OTR/L  Date:8/6/2021
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
## 2021-08-06 NOTE — PLAN OF CARE
Problem: Behavioral Health Plan of Care  Goal: Adheres to Safety Considerations for Self and Others  Outcome: Improving  Intervention: Develop and Maintain Individualized Safety Plan  Recent Flowsheet Documentation  Taken 8/6/2021 0000 by Serena Lofton RN  Safety Measures: safety rounds completed     Problem: Suicidal Behavior  Goal: Suicidal Behavior is Absent or Managed  Outcome: Improving     Problem: Sleep Disturbance  Goal: Adequate Sleep/Rest  Outcome: Improving   Pt spent a quiet night, denied anxiety and depression, denied SI / HI .  Pt denied pain / discomforts, medcompliant, will possibly discharge today.
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
## 2021-08-06 NOTE — PLAN OF CARE
Problem: Behavioral Health Plan of Care  Goal: Plan of Care Review  Outcome: adequate for discharge  Goal: Absence of New-Onset Illness or Injury  Outcome: adequate for discharge     Problem: Behavioral Health Plan of Care  Goal: Optimized Coping Skills in Response to Life Stressors  Outcome: adequate for discharge  Goal: Develops/Participates in Therapeutic [place] to Support Successful Transition  Outcome: adequate for discharge

## 2021-08-06 NOTE — PROGRESS NOTES
08/06/21 1454   Engagement   Intervention Group   Topic Detail OT: Liane bags and window clings for creative expression, planning/sequencing, building sense of self-worth and coping with stress/sxs   Attendance Attended   Patient Response Demonstrated understanding of materials provided;Positive attitude   Concentrated on Task duration of group   Mood/Affect Anxious;Pleasant;Congruent   Social/Behavioral Cooperative;Engaged;Motivated   Thought Content Insightful     Pt sat and socialized with peer while working on project; expressed that she felt anxious about discharge.

## 2021-08-06 NOTE — DISCHARGE INSTRUCTIONS
Behavioral Discharge Planning and Instructions    Summary: You were admitted on 7/31/2021  due to Suicide Attempt.  You were treated by Dr. Becky Giraldo and discharged on ***/***/*** from [facility] to home: [address] [facility] [address].    Main Diagnosis: Major depressive disorder, recurrent episode, moderate (H)    Health Care Follow-up:     Appointment type: primary care/medication managment  Provider: Desiree Cali  Appointment Date/Time: Thursday, August 19th at 1:45pm  Address: [address] [place] [address]    Phone Number: [phone]    Additional information: This is an in-person appointment.  Please bring your current medication list.     Appointment type: psychiatry  Provider: Dr. Carolina  Appointment Date/Time: TBD  Address: [address] [place] [address]    Phone Number: [phone]    Additional information: Your primary care provider has been requested to make a referral to Dr. Carolina.     Appointment type: chemical health assessment  Provider: Bushra Mcdonnell  Appointment Date/Time: Tuesday, August 10th at 10:15am.  Address: [address] [place] [address]  Phone Number:  [phone]   Additional information: Please bring contact information for two individuals who will be contacted for collateral information by the . Patient will be asked to provide UA at this appointment.    Appointment type: therapy  Provider: Kayla Baker  Appointment Date/Time: Thursday, September 16th at 12:30pm.  Address: [address] [place] [address]    Phone Number: [phone]    Additional information: This is an in-person appointment. Patient is also on the waiting list and will be contacted if a sooner appointment becomes available.     Attend all scheduled appointments with your outpatient providers. Call at least 24 hours in advance if you need to reschedule an appointment to ensure continued access to your outpatient providers.     Major Treatments, Procedures and  "Findings:  You were provided with: a psychiatric assessment, assessed for medical stability, medication evaluation and/or management, group therapy, individual therapy, milieu management and medical interventions    Symptoms to Report: feeling more aggressive, increased confusion, losing more sleep, mood getting worse or thoughts of suicide    Early warning signs can include: increased depression or anxiety sleep disturbances increased thoughts or behaviors of suicide or self-harm  increased unusual thinking, such as paranoia or hearing voices    Safety and Wellness:  Take all medicines as directed.  Make no changes unless your doctor suggests them.      Follow treatment recommendations.  Refrain from alcohol and non-prescribed drugs.  If there is a concern for safety, call 911.    Resources:   Crisis Intervention: 120.144.5324 or 639-069-5776 (TTY: 349.662.2483). Call anytime for help.  National [place] on Mental Illness (www.mn.michelle.org): 561.660.4378 or 381-083-9240.  Suicide Awareness Voices of Education (SAVE) (www.save.org): 888-511-SAVE (7283)  Mental Health Association of [place] (www.mentalhealth.org): 851.340.9517 or 948-646-8876  [facility] Crisis Response: 24/7 Mental Health Services 1-379.590.3349  Text 4 Life: txt \"LIFE\" to 19805 for immediate support and crisis intervention    General Medication Instructions:   See your medication sheet(s) for instructions.   Take all medicines as directed.  Make no changes unless your doctor suggests them.   Go to all your doctor visits.  Be sure to have all your required lab tests. This way, your medicines can be refilled on time.  Do not use any drugs not prescribed by your doctor.  Avoid alcohol.    Advance Directives:   Scanned document on file with [facility]? No scanned doc  Is document scanned? Pt states no documents  Honoring Choices Your Rights Handout: Informed and given  Was more information offered? Pt declined    The Treatment team has appreciated the " opportunity to work with you. If you have any questions or concerns about your recent admission, you can contact the unit which can receive your call 24 hours a day, 7 days a week. They will be able to get in touch with a Provider if needed. The unit number is [phone].

## 2021-08-06 NOTE — PROGRESS NOTES
Pt discharged home as ordered via family . Pt verbalized ready to be discharged. Denied SI/HI. Reduced anxiety 3/10 and zero depression. Pt discharged with medication. Discussed discharge summary with pt and pt acknowledged understanding.
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
## 2021-08-06 NOTE — PLAN OF CARE
BEHAVIORAL TEAM DISCUSSION    Participants: Charge Nurse-MF, Social work-DW, Provider-BC, Psychology-KS, Occupational therapy-AS, Pharmacist-MC  Progress: yes  Anticipated length of stay: discharge tomorrow  Continued Stay Criteria/Rationale: medication management   Medical/Physical: none noted  Precautions:   Behavioral Orders   Procedures    Code 1 - Restrict to Unit    Routine Programming     As clinically indicated    Rule 25    Rule 25    Status 15     Every 15 minutes.    Suicide precautions     Patients on Suicide Precautions should have a Combination Diet ordered that includes a Diet selection(s) AND a Behavioral Tray selection for Safe Tray - with utensils, or Safe Tray - NO utensils       Plan: return home and follow discharge plan  Rationale for change in precautions or plan: NA